# Patient Record
Sex: FEMALE | Race: WHITE
[De-identification: names, ages, dates, MRNs, and addresses within clinical notes are randomized per-mention and may not be internally consistent; named-entity substitution may affect disease eponyms.]

---

## 2017-03-29 ENCOUNTER — HOSPITAL ENCOUNTER (OUTPATIENT)
Dept: HOSPITAL 53 - M WHC | Age: 72
End: 2017-03-29
Attending: PHYSICIAN ASSISTANT
Payer: COMMERCIAL

## 2017-03-29 DIAGNOSIS — Z78.0: ICD-10-CM

## 2017-03-29 DIAGNOSIS — M85.80: ICD-10-CM

## 2017-03-29 DIAGNOSIS — Z92.23: ICD-10-CM

## 2017-03-29 DIAGNOSIS — Z80.0: ICD-10-CM

## 2017-03-29 DIAGNOSIS — Z80.3: ICD-10-CM

## 2017-03-29 DIAGNOSIS — M81.0: ICD-10-CM

## 2017-03-29 DIAGNOSIS — Z92.29: ICD-10-CM

## 2017-03-29 DIAGNOSIS — Z80.49: ICD-10-CM

## 2017-03-29 DIAGNOSIS — Z12.31: Primary | ICD-10-CM

## 2017-03-29 PROCEDURE — 77080 DXA BONE DENSITY AXIAL: CPT

## 2017-03-29 NOTE — REPMRS
Patient History

The patient states she has not had a clinical breast exam in over

a year.

Patient is postmenopausal.

Family history of colorectal cancer in father at age 54, 

endometrial cancer in mother at age 59, breast cancer in maternal

grandmother at age 88, and colorectal cancer in paternal uncle 

at age 60.

Benign excisional biopsy of the left breast.  Benign US guided 

breast biopsy of the left breast.

Took estrogen for 4 years.  Took progesterone for 4 years.

 

Digital Woman Screen Mammo: March 29, 2017 - Exam #: 

[id number]

Bilateral CC and MLO view(s) were taken.

 

Technologist: Kim Matias, Technologist

Prior study comparison: March 28, 2016, digital woman screen 

mammo performed at [facility].  March 23, 2015, 

bilateral digital mammo screening bilat, performed at [facility].  March 21, 2014, bilateral bilat screen digital 

mammo, performed at [facility] ([facility]).

 

FINDINGS: There are scattered fibroglandular densities.  There 

has been no change in the appearance of the mammogram from the 

prior studies.  There is a mild amount of residual fibroglandular

tissue which is fairly symmetric. There is no interval 

development of dominant mass, architectural distortion, or 

clustered microcalcification suggestive of malignancy.There are 

scattered, small, benign calcifications of doubtful clinical 

significance. Large coarse benign appearing calcifications are 

present. Scattered lymph nodes are seen in the right .axilla. 

Stable exam.  No significant changes when compared with prior 

studies.

 

ASSESSMENT: BI-RADS/ACR category 2 mammogram. Benign finding(s).

 

Recommendation

Routine screening mammogram in 1 year (for women over age 40).

This mammogram was interpreted with the aid of an FDA-approved 

computer-aided dectection system.

A. Negative x-ray reports should not delay biopsy if a dominant 

or clinically suspicious mass is present.

B. Four to eight percent of cancers are not identified by 

mammography.

C. Adenosis and dense breast may obscure an underlying neoplasm.

 

Electronically Signed By: Elmo Rockwell MD 03/29/17 3089

## 2017-03-30 NOTE — DEXA
AP SPINE   L1 - L4   1.130   -0.5      0.5

LT FEMUR   TOTAL   0.940   -0.5      0.4

RT FEMUR   TOTAL   0.983   -0.2      0.8

TOTAL BODY   TOTAL

OTHER



DUAL FEMUR FRAX* ASSESSMENT

Risk factors:               None.

10 year probability of fracture

Major osteoporotic fracture            8.9 %

Hip fracture               1.1 %



COMMENTS:

Normal bone densitometry of the spine.

There is low bone density of the left hip based on femoral neck T score -1.4 
last.

There is low bone density of the right hip based on femoral neck T score -1.4 
right.

The decreased density of the spine does represent a significant change since 03/
21/2014.

The increased density of the left hip does not represent a significant change 
since 03/21/2014.

The decreased density of the right hip does not represent a significant change.

The density of the spine has decreased 3.1% since the initial exam on 03/05/
2012.

The spine density has decreased 6.5% since the most recent exam on 03/21/2014.

The density of the left hip has decreased 2.0% since the initial exam on 03/05/
2012.

The density of the left hip has increased 1.4% since the most recent exam on 03/
21/2014.

The density of the right hip has increased 1.0% since the initial exam on 03/05/
2012.

The density of the right hip has decreased 0.4% since the most recent exam on 03
/21/2014.



FOLLOW-UP:

Recommendation for the next bone density exam: 2 years
LY

## 2018-03-27 ENCOUNTER — HOSPITAL ENCOUNTER (OUTPATIENT)
Dept: HOSPITAL 53 - M OPP | Age: 73
Discharge: HOME | End: 2018-03-27
Attending: SURGERY
Payer: COMMERCIAL

## 2018-03-27 DIAGNOSIS — Z79.899: ICD-10-CM

## 2018-03-27 DIAGNOSIS — Z79.82: ICD-10-CM

## 2018-03-27 DIAGNOSIS — Z96.652: ICD-10-CM

## 2018-03-27 DIAGNOSIS — K57.32: ICD-10-CM

## 2018-03-27 DIAGNOSIS — K21.9: ICD-10-CM

## 2018-03-27 DIAGNOSIS — K57.30: ICD-10-CM

## 2018-03-27 DIAGNOSIS — E66.9: ICD-10-CM

## 2018-03-27 DIAGNOSIS — Z80.0: ICD-10-CM

## 2018-03-27 DIAGNOSIS — I10: ICD-10-CM

## 2018-03-27 DIAGNOSIS — E03.9: ICD-10-CM

## 2018-03-27 DIAGNOSIS — F41.9: ICD-10-CM

## 2018-03-27 DIAGNOSIS — E78.5: ICD-10-CM

## 2018-03-27 DIAGNOSIS — F32.9: ICD-10-CM

## 2018-03-27 DIAGNOSIS — Z12.11: Primary | ICD-10-CM

## 2018-03-27 DIAGNOSIS — K64.8: ICD-10-CM

## 2018-03-27 RX ADMIN — SODIUM CHLORIDE, POTASSIUM CHLORIDE, SODIUM LACTATE AND CALCIUM CHLORIDE 1 MLS/HR: 600; 310; 30; 20 INJECTION, SOLUTION INTRAVENOUS at 11:15

## 2018-03-30 ENCOUNTER — HOSPITAL ENCOUNTER (OUTPATIENT)
Dept: HOSPITAL 53 - M WHC | Age: 73
End: 2018-03-30
Attending: PHYSICIAN ASSISTANT
Payer: COMMERCIAL

## 2018-03-30 DIAGNOSIS — Z12.31: Primary | ICD-10-CM

## 2018-03-30 DIAGNOSIS — Z98.890: ICD-10-CM

## 2018-03-30 DIAGNOSIS — Z78.0: ICD-10-CM

## 2018-03-30 DIAGNOSIS — Z92.23: ICD-10-CM

## 2018-03-30 DIAGNOSIS — R92.8: ICD-10-CM

## 2018-03-30 PROCEDURE — 77067 SCR MAMMO BI INCL CAD: CPT

## 2018-06-08 ENCOUNTER — HOSPITAL ENCOUNTER (OUTPATIENT)
Dept: HOSPITAL 53 - M LAB REF | Age: 73
End: 2018-06-08
Payer: COMMERCIAL

## 2018-06-08 DIAGNOSIS — R30.0: Primary | ICD-10-CM

## 2018-06-08 PROCEDURE — 87086 URINE CULTURE/COLONY COUNT: CPT

## 2018-06-21 ENCOUNTER — HOSPITAL ENCOUNTER (OUTPATIENT)
Dept: HOSPITAL 53 - M LAB REF | Age: 73
End: 2018-06-21
Attending: PHYSICIAN ASSISTANT
Payer: COMMERCIAL

## 2018-06-21 DIAGNOSIS — M54.5: Primary | ICD-10-CM

## 2018-06-21 LAB
BACTERIA UR QL AUTO: NEGATIVE
MUCUS, URINE: (no result)
RBC, URINE AUTO: 0 /HPF (ref 0–3)
SQUAMOUS #/AREA URNS AUTO: 0 /HPF (ref 0–6)
WBC, URINE AUTO: 2 /HPF (ref 0–3)

## 2018-06-21 PROCEDURE — 81015 MICROSCOPIC EXAM OF URINE: CPT

## 2019-04-02 ENCOUNTER — HOSPITAL ENCOUNTER (OUTPATIENT)
Dept: HOSPITAL 53 - M WHC | Age: 74
End: 2019-04-02
Attending: PHYSICIAN ASSISTANT
Payer: COMMERCIAL

## 2019-04-02 DIAGNOSIS — Z92.23: ICD-10-CM

## 2019-04-02 DIAGNOSIS — Z86.018: ICD-10-CM

## 2019-04-02 DIAGNOSIS — Z92.29: ICD-10-CM

## 2019-04-02 DIAGNOSIS — Z12.31: Primary | ICD-10-CM

## 2019-04-02 DIAGNOSIS — Z80.49: ICD-10-CM

## 2019-04-02 DIAGNOSIS — M94.9: ICD-10-CM

## 2019-04-02 DIAGNOSIS — M81.0: ICD-10-CM

## 2019-04-02 DIAGNOSIS — Z78.0: ICD-10-CM

## 2019-04-02 NOTE — REPMRS
Patient History

The patient states she has not had a clinical breast exam in over

a year.

Patient is postmenopausal.

Family history of endometrial cancer at age 59 in mother, 

colorectal cancer at age 54 in father, breast cancer at age 88 in

maternal grandmother, colorectal cancer at age 60 in paternal 

uncle, breast cancer at age 90 in maternal aunt.

Benign excisional biopsy of the left breast.  Benign US guided 

breast biopsy of the left breast.

Took estrogen for 4 years.  Took progesterone for 4 years.

 

3D TOMOSYNTHESIS WAS PERFORMED.

 

Digital Woman Screen Mammo: April 2, 2019 - Exam #: 

[id number]

Bilateral MLO and CC view(s) were taken.  CV view(s) were taken 

of the left breast.

 

Technologist: Brittnee Rivera, Technologist

Prior study comparison: March 30, 2018, digital woman screen 

mammo performed at [facility].  March 29, 2017, digital woman screen mammo performed at [facility].

 

FINDINGS: The breast tissue is heterogeneously dense.  This may 

lower the sensitivity of mammography.  There has been no change 

in the appearance of the mammogram from the prior studies.  There

is a moderate amount of residual fibroglandular tissue which is 

fairly symmetric. There is no interval development of dominant 

mass, areas of architectural distortion, or clustered 

microcalcification typical of malignancy.

 

Assessment: BI-RADS/ACR category 1 mammogram. Negative Mammogram.

 

Recommendation

Routine screening mammogram in 1 year (for women over age 40).

This mammogram was interpreted with the aid of an FDA-approved 

computer-aided dectection system.

 

Electronically Signed By: Ferdinand Peoples MD 04/02/19 1634

## 2019-04-08 NOTE — DEXA
AP SPINE   L1 - L4   1.188   -0.1      1.6

LT FEMUR   TOTAL   0.936   -0.6      1.0

LT NECK      0.821   -1.6      0.2

RT FEMUR   TOTAL   0.975   -0.3      1.3      

RT NECK      0.834   -1.5      0.3

TOTAL BODY   TOTAL

OTHER



COMMENTS:

Normal bone densitometry of the spine.

There is low bone density of the hips.

The increased density of the spine does represent a significant change.

The decreased density of the left hip does not represent a significant change.

The decreased density of the right hip does not represent a significant change.

The density of the spine has increased 1.9% since the initial exam on 
03/05/2012.

The spine density has increased 5.1% since the most recent exam on 03/29/2017.

The density of the left hip has decreased 2.4% since the initial exam on 
03/05/2012.

The density of the left hip has decreased 0.4% since the most recent exam on 
03/29/2017.

The density of the right hip has increased 0.2% since the initial exam on 
03/05/2012.

The density of the right hip has decreased 0.8% since the most recent exam on 
03/29/2017.



FOLLOW-UP:

Recommendation for the next bone density exam: 2 years.



LY

## 2020-06-30 ENCOUNTER — HOSPITAL ENCOUNTER (OUTPATIENT)
Dept: HOSPITAL 53 - M WHC | Age: 75
End: 2020-06-30
Attending: PHYSICIAN ASSISTANT
Payer: COMMERCIAL

## 2020-06-30 DIAGNOSIS — Z80.0: ICD-10-CM

## 2020-06-30 DIAGNOSIS — Z12.31: Primary | ICD-10-CM

## 2020-06-30 DIAGNOSIS — Z80.49: ICD-10-CM

## 2020-06-30 NOTE — REPMRS
Patient History

The patient states she has not had a clinical breast exam in over

a year.

Family history of endometrial cancer at age 59 in mother, 

colorectal cancer at age 54 in father, breast cancer at age 88 in

maternal grandmother, colorectal cancer at age 60 in paternal 

uncle, breast cancer at age 90 in maternal aunt.

Benign excisional biopsy of the left breast.  Benign US guided 

breast biopsy of the left breast.

Took estrogen for 4 years.  Took progesterone for 4 years.

 

Digital Woman Screen Mammo: June 30, 2020 - Exam #: 

[id number]

Bilateral CC and MLO view(s) were taken.

 

Technologist: Mirna Flores, Technologist

Prior study comparison: April 2, 2019, bilateral digital woman 

screen mammo performed at [facility].  March 30, 2018, digital woman screen mammo 

performed at [facility].  

March 29, 2017, digital woman screen mammo performed at [facility].

 

FINDINGS: There are scattered fibroglandular densities.  The 

Volpara volumetric breast density category is:A.  There has been 

no change in the appearance of the mammogram from the prior 

studies.  There is a mild amount of scattered fibroglandular 

density which is fairly symmetric. There is no interval 

development of dominant mass, architectural distortion, or 

grouped microcalcification suggestive of malignancy.

3-D tomosynthesis shows no additional findings.

 

Assessment: BI-RADS/ACR category 1 mammogram. Negative Mammogram.

 

Recommendation

Routine screening mammogram of both breasts in 1 year (for women 

over age 40).

This patient's Lifetime Breast Cancer Risk is estimated at 6.7 %.

This mammogram was interpreted with the aid of an FDA-approved 

computer-aided dectection system.

 

Electronically Signed By: Santiago Burns MD 06/30/20 [id number]

## 2020-12-08 ENCOUNTER — HOSPITAL ENCOUNTER (OUTPATIENT)
Dept: HOSPITAL 53 - M LABSMTC | Age: 75
End: 2020-12-08
Attending: PEDIATRICS
Payer: SELF-PAY

## 2020-12-08 DIAGNOSIS — Z11.59: Primary | ICD-10-CM

## 2021-06-23 ENCOUNTER — HOSPITAL ENCOUNTER (OUTPATIENT)
Dept: HOSPITAL 53 - M WHC | Age: 76
End: 2021-06-23
Attending: PHYSICIAN ASSISTANT
Payer: COMMERCIAL

## 2021-06-23 DIAGNOSIS — Z12.31: Primary | ICD-10-CM

## 2021-06-23 NOTE — REPMRS
Patient History

The patient states she has not had a clinical breast exam in over

a year.

Family history of endometrial cancer at age 59 in mother, 

colorectal cancer at age 54 in father, breast cancer at age 88 in

maternal grandmother, colorectal cancer at age 60 in paternal 

uncle, breast cancer at age 90 in maternal aunt.

Benign excisional biopsy of the left breast.  Benign US guided 

breast biopsy of the left breast.

Took estrogen for 4 years.  Took progesterone for 4 years.

Patient states no breast complaints today. Patient has signed MRS

History Sheet.

 

Digital Woman Screen Mammo: June 23, 2021 - Exam #: 

[id number]

Bilateral CC and MLO view(s) were taken.

 

Technologist: Romina Meyer, Technologist

Prior study comparison: June 30, 2020, bilateral digital woman 

screen mammo performed at [facility].  April 2, 2019, bilateral digital woman screen mammo 

performed at [facility].  March 30, 2018, digital woman screen mammo performed at [facility].

 

FINDINGS: There are scattered fibroglandular densities.  The 

Volpara volumetric breast density category is:B.  There has been 

no change in the appearance of the mammogram from the prior 

studies.  There is a mild amount of scattered fibroglandular 

density which is fairly symmetric. There is no interval 

development of dominant mass, architectural distortion, or 

grouped microcalcification suggestive of malignancy.

3-D tomosynthesis shows no additional findings.

 

Assessment: BI-RADS/ACR category 1 mammogram. Negative Mammogram.

 

Recommendation

Routine screening mammogram of both breasts in 1 year (for women 

over age 40).

This patient's  [facility] Lifetime Breast Cancer Risk is 

estimated at 6.3 %.

This mammogram was interpreted with the aid of an FDA-approved 

computer-aided dectection system.

 

Electronically Signed By: Santiago Burns MD 06/23/21 [id number]

## 2021-09-26 ENCOUNTER — HOSPITAL ENCOUNTER (OUTPATIENT)
Dept: HOSPITAL 53 - M WUC | Age: 76
End: 2021-09-26
Attending: PHYSICIAN ASSISTANT
Payer: COMMERCIAL

## 2021-09-26 DIAGNOSIS — N39.0: Primary | ICD-10-CM

## 2022-07-12 ENCOUNTER — HOSPITAL ENCOUNTER (OUTPATIENT)
Dept: HOSPITAL 53 - M WHC | Age: 77
End: 2022-07-12
Attending: NURSE PRACTITIONER
Payer: COMMERCIAL

## 2022-07-12 DIAGNOSIS — Z12.31: Primary | ICD-10-CM

## 2023-07-17 ENCOUNTER — HOSPITAL ENCOUNTER (OUTPATIENT)
Dept: HOSPITAL 53 - M WHC | Age: 78
End: 2023-07-17
Attending: FAMILY MEDICINE
Payer: COMMERCIAL

## 2023-07-17 DIAGNOSIS — Z12.31: Primary | ICD-10-CM
